# Patient Record
Sex: FEMALE | Race: WHITE | URBAN - METROPOLITAN AREA
[De-identification: names, ages, dates, MRNs, and addresses within clinical notes are randomized per-mention and may not be internally consistent; named-entity substitution may affect disease eponyms.]

---

## 2022-11-30 ENCOUNTER — OFFICE VISIT (OUTPATIENT)
Dept: FAMILY MEDICINE CLINIC | Age: 57
End: 2022-11-30
Payer: COMMERCIAL

## 2022-11-30 VITALS
SYSTOLIC BLOOD PRESSURE: 125 MMHG | BODY MASS INDEX: 29.91 KG/M2 | HEIGHT: 61 IN | WEIGHT: 158.4 LBS | TEMPERATURE: 98.3 F | RESPIRATION RATE: 18 BRPM | HEART RATE: 74 BPM | DIASTOLIC BLOOD PRESSURE: 79 MMHG | OXYGEN SATURATION: 95 %

## 2022-11-30 DIAGNOSIS — M54.42 CHRONIC MIDLINE LOW BACK PAIN WITH BILATERAL SCIATICA: ICD-10-CM

## 2022-11-30 DIAGNOSIS — N95.1 HOT FLASH, MENOPAUSAL: ICD-10-CM

## 2022-11-30 DIAGNOSIS — E89.40 SURGICAL MENOPAUSE: Primary | ICD-10-CM

## 2022-11-30 DIAGNOSIS — Z12.31 ENCOUNTER FOR SCREENING MAMMOGRAM FOR MALIGNANT NEOPLASM OF BREAST: ICD-10-CM

## 2022-11-30 DIAGNOSIS — Z12.11 COLON CANCER SCREENING: ICD-10-CM

## 2022-11-30 DIAGNOSIS — M79.18 MYOFASCIAL PAIN: ICD-10-CM

## 2022-11-30 DIAGNOSIS — R73.02 IGT (IMPAIRED GLUCOSE TOLERANCE): ICD-10-CM

## 2022-11-30 DIAGNOSIS — E78.2 MIXED HYPERLIPIDEMIA: ICD-10-CM

## 2022-11-30 DIAGNOSIS — M54.41 CHRONIC MIDLINE LOW BACK PAIN WITH BILATERAL SCIATICA: ICD-10-CM

## 2022-11-30 DIAGNOSIS — G89.29 CHRONIC MIDLINE LOW BACK PAIN WITH BILATERAL SCIATICA: ICD-10-CM

## 2022-11-30 PROCEDURE — 99204 OFFICE O/P NEW MOD 45 MIN: CPT | Performed by: FAMILY MEDICINE

## 2022-11-30 RX ORDER — TRAMADOL HYDROCHLORIDE 50 MG/1
100 TABLET ORAL 2 TIMES DAILY
COMMUNITY
Start: 2022-11-02

## 2022-11-30 RX ORDER — NORTRIPTYLINE HYDROCHLORIDE 25 MG/1
CAPSULE ORAL
Qty: 60 CAPSULE | Refills: 3 | Status: SHIPPED | OUTPATIENT
Start: 2022-11-30

## 2022-11-30 RX ORDER — ESTRADIOL 2 MG/1
2 TABLET ORAL DAILY
Qty: 90 TABLET | Refills: 3 | Status: SHIPPED | OUTPATIENT
Start: 2022-11-30

## 2022-11-30 RX ORDER — RIZATRIPTAN BENZOATE 5 MG/1
5 TABLET ORAL
COMMUNITY

## 2022-11-30 RX ORDER — ESTRADIOL 2 MG/1
2 TABLET ORAL DAILY
COMMUNITY
Start: 2022-10-31 | End: 2022-11-30 | Stop reason: SDUPTHER

## 2022-11-30 RX ORDER — GABAPENTIN 300 MG/1
300 CAPSULE ORAL 2 TIMES DAILY
COMMUNITY
Start: 2022-07-01

## 2022-11-30 RX ORDER — HYDROXYCHLOROQUINE SULFATE 200 MG/1
200 TABLET, FILM COATED ORAL 2 TIMES DAILY
COMMUNITY
Start: 2022-10-28

## 2022-11-30 RX ORDER — CYCLOBENZAPRINE HCL 5 MG
5 TABLET ORAL
COMMUNITY
Start: 2022-06-02

## 2022-11-30 NOTE — PROGRESS NOTES
Previous pcp brea amaya [phone]    Health Maintenance Due   Topic Date Due    Hepatitis C Screening  Never done    Depression Screen  Never done    COVID-19 Vaccine (1) Never done    DTaP/Tdap/Td series (1 - Tdap) Never done    Cervical cancer screen  Never done    Lipid Screen  Never done    Colorectal Cancer Screening Combo  Never done    Shingrix Vaccine Age 50> (1 of 2) Never done    Breast Cancer Screen Mammogram  Never done    Flu Vaccine (1) Never done     1. \"Have you been to the ER, urgent care clinic since your last visit? Hospitalized since your last visit? \" No    2. \"Have you seen or consulted any other health care providers outside of the [address] [place] [address] since your last visit? \"  Yes. Orthopaedic      3. For patients aged 39-70: Has the patient had a colonoscopy / FIT/ Cologuard? No      If the patient is female:    4. For patients aged 41-77: Has the patient had a mammogram within the past 2 years? No      5. For patients aged 21-65: Has the patient had a pap smear?  No. Hysterectomy

## 2022-11-30 NOTE — PROGRESS NOTES
HPI  Leatha Benjamin is a 62 y.o. female who presents to establish care. Some record available in Care Everywhere. Last contact with rheumatology July 2022 at [facility]. Primarily here to establish PCP and get a refill on her estradiol. Plans to keep all of her [facility] specialists. Tells me that at age 27 she had a hysterectomy for PID. She requested a total hysterectomy with oophorectomy. Has surgical menopause and has been on estradiol ever since. Was off of it for about a year due to a lapse in insurance last year and restarted it 3 months ago when she visited a doctor in [place]. Feels a lot better on the medication. Tells me that she has chronic low back pain due to DJD. Has very infrequent migraines. Has a rizatriptan bottle from 2019. Numbness in her toes which has been attributed to her back pain and sciatica. Numbness in her fingers which sounds like carpal tunnel. Was started on Plaquenil 3 months ago and she is not sure what if anything it is doing. Will be seeing rheumatologist in the next few weeks. Takes Flexeril which helps out with her back on occasion. Uses 10 mg.  Uses gabapentin 300 mg at night which helps with sleep and a little bit with her foot neuropathy. Does not take it during the day because she needs to operate heavy machinery    Has a \"inflammatory arthritis\" with myofascial pains. Has been prescribed gabapentin and tramadol. NSAIDs cause fluid retention in the past.  Flexeril. Was started on Plaquenil 200 mg daily in July. Given a little prednisone    There is mention of MRI L-spine showing L4-L5 left foraminal disc protrusion and moderate left and mild right foraminal stenosis. Mild central canal stenosis    Saw PMNR. Note from 3/2022 notes and EMG from February 2022 revealing mild chronic S1 radiculopathy on the right and the left.   He was given an epidural steroid injection which provided some improvement    Last lab work available from 3/2022  Hep B immune  Negative HCV antibody  Vitamin D 28  Hemoglobin 13.7  Glucose 91    A1c 5.9  TSH 0.79    PMHx:  Past Medical History:   Diagnosis Date    Bulging lumbar disc     L4-L5    Chronic pain     Headache        Meds:   Current Outpatient Medications   Medication Sig Dispense Refill    traMADoL (ULTRAM) 50 mg tablet Take 100 mg by mouth two (2) times a day. cyclobenzaprine (FLEXERIL) 5 mg tablet Take 5 mg by mouth nightly as needed. hydrOXYchloroQUINE (PLAQUENIL) 200 mg tablet Take 200 mg by mouth two (2) times a day.      gabapentin (NEURONTIN) 300 mg capsule Take 300 mg by mouth two (2) times a day. cholecalciferol, vitamin D3, (VITAMIN D3 PO) Take 500 mcg by mouth daily. rizatriptan (MAXALT) 5 mg tablet Take 5 mg by mouth once as needed for Migraine. May repeat in 2 hours if needed      nortriptyline (PAMELOR) 25 mg capsule Take 1 cap nightly for 1 week then 2 caps nightly 60 Capsule 3    estradioL (ESTRACE) 2 mg tablet Take 1 Tablet by mouth daily. 90 Tablet 3       Allergies: Allergies   Allergen Reactions    Ketorolac Hives    Kiwi (Actinidia Chinensis) Hives    Morpholine Analogues Hives and Swelling       Smoker:  Social History     Tobacco Use   Smoking Status Former    Types: Cigarettes    Quit date:     Years since quittin.9   Smokeless Tobacco Never       ETOH:   Social History     Substance and Sexual Activity   Alcohol Use Not Currently       FH:   Family History   Problem Relation Age of Onset    Emphysema Mother     High Cholesterol Mother     Heart Disease Father        ROS:   As listed in HPI. In addition:  Constitutional:   No headache, fever, fatigue, weight loss or weight gain      Cardiac:    No chest pain      Resp:   No cough or shortness of breath      Neuro   No loss of consciousness, dizziness, seizures      Physical Exam:  Blood pressure 125/79, pulse 74, temperature 98.3 °F (36.8 °C), temperature source Temporal, resp.  rate 18, height 5' 1\" (1.549 m), weight 158 lb 6.4 oz (71.8 kg), last menstrual period 11/30/1998, SpO2 95 %. GEN: No apparent distress. Alert and oriented and responds to all questions appropriately. NEUROLOGIC:  No focal neurologic deficits. Strength and sensation grossly intact. Coordination and gait grossly intact. EXT: Well perfused. No edema. SKIN: No obvious rashes. Lungs clear to auscultation bilaterally  CV regular rate rhythm no murmur  Back examined. There is exquisite tenderness to palpation L3-L4-L5 and S1 in the midline paraspinal and postural muscles at this level. The muscles are myofascial in nature. There is also myofascial pain in the biceps and forearms  She has a left groin pain she would like to take a look at. Hips have a full range of motion bilaterally. Range of motion does not reproduce her groin pain. Palpation of the gracilis muscle does reproduce her groin pain        Assessment and Plan     surgical menopause  Hysterectomy at age 27  Chronic estradiol since. Refilled this  Emphasized the importance of cancer screening. Does not need Pap smears but does need mammogram    Myofascial pain  I think she has a component of fibromyalgia. Reading through her rheumatology note seems to agree. Does not recognize Cymbalta nortriptyline or amitriptyline is medications that she has been offered in the past  Willing to try nortriptyline 25 mg up to 50 mg in 1 week  Cautioned about sedating side effect. 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 The left groin pain is a gracilis muscle spasm. Educated on how to stretch this out. Inflammatory arthritis  Plaque  Care of rheumatology and has follow-up soon. Low back osteoarthritis and sciatica  Care orthopedics.   Following up soon  Tramadol prescribed by orthopedics  Gabapentin managed by orthopedics    [facility]  Fit kit  Mammogram    This was a 45-minute visit. Greater than 50% of the time was spent counseling the patient on menopause and pain syndrome. ICD-10-CM ICD-9-CM    1. Surgical menopause  E89.40 627.4 TOM MAMMO BI SCREENING INCL CAD      2. Hot flash, menopausal  N95.1 627.2 TOM MAMMO BI SCREENING INCL CAD      3. Myofascial pain  M79.18 729.1 nortriptyline (PAMELOR) 25 mg capsule      4. Chronic midline low back pain with bilateral sciatica  M54.41 724.2     M54.42 724.3     G89.29 338.29       5. Encounter for screening mammogram for malignant neoplasm of breast  Z12.31 V76.12 TOM MAMMO BI SCREENING INCL CAD      6. Colon cancer screening  Z12.11 V76.51 OCCULT BLOOD IMMUNOASSAY,DIAGNOSTIC      OCCULT BLOOD IMMUNOASSAY,DIAGNOSTIC      7. IGT (impaired glucose tolerance)  R73.02 790.22 LIPID PANEL      CBC WITH AUTOMATED DIFF      METABOLIC PANEL, COMPREHENSIVE      HEMOGLOBIN A1C WITH EAG      LIPID PANEL      CBC WITH AUTOMATED DIFF      METABOLIC PANEL, COMPREHENSIVE      HEMOGLOBIN A1C WITH EAG      8. Mixed hyperlipidemia  E78.2 272.2 LIPID PANEL      CBC WITH AUTOMATED DIFF      METABOLIC PANEL, COMPREHENSIVE      LIPID PANEL      CBC WITH AUTOMATED DIFF      METABOLIC PANEL, COMPREHENSIVE          AVS given.  Pt expressed understanding of instructions

## 2022-12-01 LAB
ALBUMIN SERPL-MCNC: 4.5 G/DL (ref 3.8–4.9)
ALBUMIN/GLOB SERPL: 2 {RATIO} (ref 1.2–2.2)
ALP SERPL-CCNC: 92 IU/L (ref 44–121)
ALT SERPL-CCNC: 18 IU/L (ref 0–32)
AST SERPL-CCNC: 23 IU/L (ref 0–40)
BASOPHILS # BLD AUTO: 0 X10E3/UL (ref 0–0.2)
BASOPHILS NFR BLD AUTO: 1 %
BILIRUB SERPL-MCNC: 0.2 MG/DL (ref 0–1.2)
BUN SERPL-MCNC: 11 MG/DL (ref 6–24)
BUN/CREAT SERPL: 15 (ref 9–23)
CALCIUM SERPL-MCNC: 9.5 MG/DL (ref 8.7–10.2)
CHLORIDE SERPL-SCNC: 97 MMOL/L (ref 96–106)
CHOLEST SERPL-MCNC: 212 MG/DL (ref 100–199)
CO2 SERPL-SCNC: 24 MMOL/L (ref 20–29)
CREAT SERPL-MCNC: 0.74 MG/DL (ref 0.57–1)
EGFR: 94 ML/MIN/1.73
EOSINOPHIL # BLD AUTO: 0.1 X10E3/UL (ref 0–0.4)
EOSINOPHIL NFR BLD AUTO: 1 %
ERYTHROCYTE [DISTWIDTH] IN BLOOD BY AUTOMATED COUNT: 12.5 % (ref 11.7–15.4)
EST. AVERAGE GLUCOSE BLD GHB EST-MCNC: 108 MG/DL
GLOBULIN SER CALC-MCNC: 2.3 G/DL (ref 1.5–4.5)
GLUCOSE SERPL-MCNC: 93 MG/DL (ref 70–99)
HBA1C MFR BLD: 5.4 % (ref 4.8–5.6)
HCT VFR BLD AUTO: 40.8 % (ref 34–46.6)
HDLC SERPL-MCNC: 85 MG/DL
HGB BLD-MCNC: 14 G/DL (ref 11.1–15.9)
IMM GRANULOCYTES # BLD AUTO: 0 X10E3/UL (ref 0–0.1)
IMM GRANULOCYTES NFR BLD AUTO: 0 %
IMP & REVIEW OF LAB RESULTS: NORMAL
LDLC SERPL CALC-MCNC: 114 MG/DL (ref 0–99)
LYMPHOCYTES # BLD AUTO: 2.1 X10E3/UL (ref 0.7–3.1)
LYMPHOCYTES NFR BLD AUTO: 34 %
MCH RBC QN AUTO: 30.4 PG (ref 26.6–33)
MCHC RBC AUTO-ENTMCNC: 34.3 G/DL (ref 31.5–35.7)
MCV RBC AUTO: 89 FL (ref 79–97)
MONOCYTES # BLD AUTO: 0.7 X10E3/UL (ref 0.1–0.9)
MONOCYTES NFR BLD AUTO: 11 %
NEUTROPHILS # BLD AUTO: 3.3 X10E3/UL (ref 1.4–7)
NEUTROPHILS NFR BLD AUTO: 53 %
PLATELET # BLD AUTO: 364 X10E3/UL (ref 150–450)
POTASSIUM SERPL-SCNC: 4.4 MMOL/L (ref 3.5–5.2)
PROT SERPL-MCNC: 6.8 G/DL (ref 6–8.5)
RBC # BLD AUTO: 4.61 X10E6/UL (ref 3.77–5.28)
SODIUM SERPL-SCNC: 134 MMOL/L (ref 134–144)
TRIGL SERPL-MCNC: 71 MG/DL (ref 0–149)
VLDLC SERPL CALC-MCNC: 13 MG/DL (ref 5–40)
WBC # BLD AUTO: 6.1 X10E3/UL (ref 3.4–10.8)